# Patient Record
(demographics unavailable — no encounter records)

---

## 2025-05-22 NOTE — HISTORY OF PRESENT ILLNESS
[de-identified] : 5/22/25: 26yo female (RHD) presents for RIGHT thumb pain after cutting it with a Mandolin slicer on 4/9/25. Went to [facility] on [address] => referred to [place] [facility] due to bleeding => wound cauterized with silver nitrate and Surgicel applied. She has kept the wound dry and covered since then. PCP referred here due to black discoloration concerning for necrosis. [FreeTextEntry5] : JOJO radford [RHD] 27 year old female is here today for evaluation of RIGHT thumb pain since 04/09/25 after cutting it on a mandolin slicer. went to [facility] on [address] +surgicel then [place] [facility] +xrays. treated by outside provider which states it looks necrotic, referred here.

## 2025-05-22 NOTE — ASSESSMENT
[FreeTextEntry1] : I independently reviewed and interpreted outside @[place] 4/9/25 XRAYS OF RIGHT THUMB (3 views - PA, OBLIQUE, AND LATERAL VIEWS): no acute displaced fracture or dislocation. I reviewed external record - procedure note from [place] [facility] 4/9/25.  The condition was explained to the patient. Black discoloration likely due to silver nitrate. No signs/symptoms of infection on exam today.  Anticipate 1 year for maximal healing. May have permanent deformity and numbness at site of injury.  - ok to wash wound with soap and water. do not apply alcohol, betadine, peroxide to wound. - wound dressed with band-aid and stockinette in office today. - once Surgicel absorbs or falls off, apply thin layer of Vaseline over wound (not on intact skin), cover with non-stick dressing until healed, change dressing daily. - encouraged HEP (thumb IPJ flexion) at least 3x/day to reduce stiffness.  F/u PRN.

## 2025-05-22 NOTE — IMAGING
[de-identified] : RIGHT HAND wound over thumb pulp, obscured by Surgicel. black discoloration around wound. no erythema or drainage. good EPL, limited FPL. fingers good extension, flex to full fist. good finger abduction and adduction. decreased SLT to thumb pulp. palpable radial pulse, brisk cap refill all digits. no triggering.  @[place] 4/9/25 XRAYS OF RIGHT THUMB (3 views - PA, OBLIQUE, AND LATERAL VIEWS): no acute displaced fracture or dislocation.